# Patient Record
Sex: MALE | Race: WHITE | Employment: FULL TIME | URBAN - METROPOLITAN AREA
[De-identification: names, ages, dates, MRNs, and addresses within clinical notes are randomized per-mention and may not be internally consistent; named-entity substitution may affect disease eponyms.]

---

## 2022-07-24 ENCOUNTER — HOSPITAL ENCOUNTER (EMERGENCY)
Age: 42
Discharge: HOME | End: 2022-07-24
Attending: EMERGENCY MEDICINE
Payer: COMMERCIAL

## 2022-07-24 VITALS
RESPIRATION RATE: 16 BRPM | HEIGHT: 70 IN | SYSTOLIC BLOOD PRESSURE: 159 MMHG | TEMPERATURE: 97.8 F | HEART RATE: 86 BPM | DIASTOLIC BLOOD PRESSURE: 97 MMHG | WEIGHT: 200 LBS | OXYGEN SATURATION: 100 % | BODY MASS INDEX: 28.63 KG/M2

## 2022-07-24 DIAGNOSIS — K02.9 PAIN DUE TO DENTAL CARIES: Primary | ICD-10-CM

## 2022-07-24 PROCEDURE — 99283 EMERGENCY DEPT VISIT LOW MDM: CPT

## 2022-07-24 PROCEDURE — 74011000250 HC RX REV CODE- 250: Performed by: EMERGENCY MEDICINE

## 2022-07-24 PROCEDURE — 74011250637 HC RX REV CODE- 250/637: Performed by: EMERGENCY MEDICINE

## 2022-07-24 RX ORDER — OXYCODONE AND ACETAMINOPHEN 5; 325 MG/1; MG/1
1 TABLET ORAL
Qty: 8 TABLET | Refills: 0 | Status: SHIPPED | OUTPATIENT
Start: 2022-07-24 | End: 2022-07-27

## 2022-07-24 RX ORDER — CLINDAMYCIN HYDROCHLORIDE 150 MG/1
150 CAPSULE ORAL 3 TIMES DAILY
Qty: 30 CAPSULE | Refills: 0 | Status: SHIPPED | OUTPATIENT
Start: 2022-07-24 | End: 2022-08-03

## 2022-07-24 RX ADMIN — BENZOCAINE: 200 SPRAY DENTAL; ORAL; PERIODONTAL at 01:26

## 2022-07-24 NOTE — ED PROVIDER NOTES
Date of Service:  7/24/2022    Patient:  Shubham Thomas    Chief Complaint:  Dental Pain       HPI:  Shubham Thomas is a 38 y.o.  male who presents for evaluation of dentalgia. X2 days, he already has an appointment with his dentist on Monday. Has been alternating Tylenol and Motrin with no relief. He comes in simply asking for pain control. He denies other complaints. No past medical history on file. No past surgical history on file. No family history on file. Social History     Socioeconomic History    Marital status:      Spouse name: Not on file    Number of children: Not on file    Years of education: Not on file    Highest education level: Not on file   Occupational History    Not on file   Tobacco Use    Smoking status: Not on file    Smokeless tobacco: Not on file   Substance and Sexual Activity    Alcohol use: Not on file    Drug use: Not on file    Sexual activity: Not on file   Other Topics Concern    Not on file   Social History Narrative    Not on file     Social Determinants of Health     Financial Resource Strain: Not on file   Food Insecurity: Not on file   Transportation Needs: Not on file   Physical Activity: Not on file   Stress: Not on file   Social Connections: Not on file   Intimate Partner Violence: Not on file   Housing Stability: Not on file         ALLERGIES: Penicillins    Review of Systems   HENT:  Positive for dental problem. All other systems reviewed and are negative. Vitals:    07/24/22 0054   BP: (!) 159/97   Pulse: 86   Resp: 16   Temp: 97.8 °F (36.6 °C)   SpO2: 100%   Weight: 90.7 kg (200 lb)   Height: 5' 10\" (1.778 m)            Physical Exam  Vitals and nursing note reviewed. Constitutional:       Appearance: Normal appearance. HENT:      Mouth/Throat:        Comments: Pain, poor dentition, rotting teeth  Cardiovascular:      Rate and Rhythm: Normal rate.    Pulmonary:      Effort: Pulmonary effort is normal.   Abdominal:      General: Abdomen is flat. Neurological:      Mental Status: He is alert. MDM     VITAL SIGNS:  Patient Vitals for the past 4 hrs:   Temp Pulse Resp BP SpO2   07/24/22 0054 97.8 °F (36.6 °C) 86 16 (!) 159/97 100 %         LABS:  No results found for this or any previous visit (from the past 6 hour(s)). IMAGING:  No orders to display         Medications During Visit:  Medications   dental ball (lidocaine/benadryl/benzocaine) mixture (has no administration in time range)         DECISION MAKING:  Kwadwo Somers is a 38 y.o. male who comes in as above. Here, patient appears well. Poor dentition most likely the cause of his caries. He has follow-up on Monday. We will provide clindamycin based on his allergy to penicillin, pain control and have him follow-up with his dentist.      IMPRESSION:  1. Pain due to dental caries        DISPOSITION:  Discharged      Current Discharge Medication List        START taking these medications    Details   clindamycin (CLEOCIN) 150 mg capsule Take 1 Capsule by mouth three (3) times daily for 10 days. Qty: 30 Capsule, Refills: 0  Start date: 7/24/2022, End date: 8/3/2022      oxyCODONE-acetaminophen (Percocet) 5-325 mg per tablet Take 1 Tablet by mouth every six (6) hours as needed for Pain for up to 3 days. Max Daily Amount: 4 Tablets. Qty: 8 Tablet, Refills: 0  Start date: 7/24/2022, End date: 7/27/2022    Associated Diagnoses: Pain due to dental caries              Follow-up Information       Follow up With Specialties Details Why Contact Info    Your Dentist  Schedule an appointment as soon as possible for a visit                 The patient is asked to follow-up with their primary care provider in the next several days. They are to call tomorrow for an appointment. The patient is asked to return promptly for any increased concerns or worsening of symptoms. They can return to this emergency department or any other emergency department.     Procedures

## 2022-07-24 NOTE — ED TRIAGE NOTES
Patient arrives ambulatory to ED with complaints of right lower dental pain x 2 days     Rotating 1000 mg tylenol and 600 mg ibuprofen every 6 hours